# Patient Record
Sex: FEMALE | Race: WHITE | NOT HISPANIC OR LATINO
[De-identification: names, ages, dates, MRNs, and addresses within clinical notes are randomized per-mention and may not be internally consistent; named-entity substitution may affect disease eponyms.]

---

## 2021-05-17 ENCOUNTER — APPOINTMENT (OUTPATIENT)
Dept: BARIATRICS | Facility: CLINIC | Age: 46
End: 2021-05-17

## 2021-05-17 ENCOUNTER — APPOINTMENT (OUTPATIENT)
Dept: BARIATRICS | Facility: CLINIC | Age: 46
End: 2021-05-17
Payer: COMMERCIAL

## 2021-05-17 VITALS — HEIGHT: 60 IN | WEIGHT: 210 LBS | BODY MASS INDEX: 41.23 KG/M2

## 2021-05-17 DIAGNOSIS — Z86.59 PERSONAL HISTORY OF OTHER MENTAL AND BEHAVIORAL DISORDERS: ICD-10-CM

## 2021-05-17 DIAGNOSIS — Z86.73 PERSONAL HISTORY OF TRANSIENT ISCHEMIC ATTACK (TIA), AND CEREBRAL INFARCTION W/OUT RESIDUAL DEFICITS: ICD-10-CM

## 2021-05-17 DIAGNOSIS — Z78.9 OTHER SPECIFIED HEALTH STATUS: ICD-10-CM

## 2021-05-17 PROCEDURE — 99205 OFFICE O/P NEW HI 60 MIN: CPT | Mod: 95

## 2021-05-21 NOTE — ASSESSMENT
[FreeTextEntry1] : Diagnosis: OBesity Class 3 \par Lifestyle modification education provided. \par Dietary: Educated on healthy plate model. Encouraged to increase lean protein intake and fruit/vegetable intake daily. Encouraged to replace simple carbs with more complex carbs. Encouraged to track intake \par Exercise: set a goal to start walking while her son is in class/PT (20-30 minutes 2-3 times per week). Start using Pelaton when it arrives\par Anxiety- recommend deep breathing/ meditation with her son. Recommend reaching out to the therapist she has a working relationship with \par Medication: meets criteria to initiate weight loss medication & may benefit from Wellbutrin. pt does not want to start medication at this time. Will continue with lifestyle modification. \par Scheduled a follow up session in 2 weeks- pt feesl that she would benefit from frequent check ins. Schedule appt for June 1st at 7:00 am \par \par

## 2021-05-21 NOTE — HISTORY OF PRESENT ILLNESS
[Improved Health] : Improved health [Quality of Life] : Quality of life [Childhood] : childhood [Commerial Program: _____] : commercial program: [unfilled] [Poor eating habits] : poor eating habits [Time management] : time management [Cravings] : cravings [Motivation] : motivation [Unsure what to eat] : unsure what to eat [Depression/anxiety] : depression/anxiety [Other: ____] : [unfilled] [Other: (explain) _____] : [unfilled] [______] : [unfilled] [I sometimes wake up gasping for air] : I sometimes wake up gasping for air [Breakfast] : breakfast [Lunch] : lunch [Dinner] : dinner [Don't snack] : don't snack [1] : Cups of coffee per day: 1 [Nothing, just black coffee] : nothing, just black coffee [Self] : self [Emotional Eating] : emotional eating [2+ miles] : Walking distance capability: 2+ miles [Walking] : walking [Other: ___] : [unfilled] [0] : 0 [None] : none [GERD] : GERD [Home] : at home, [unfilled] , at the time of the visit. [Other Location: e.g. Home (Enter Location, City,State)___] : at [unfilled] [Verbal consent obtained from patient] : the patient, [unfilled] [FreeTextEntry2] : 150 [FreeTextEntry3] : 210 [] : No [FreeTextEntry1] : 46 y/o pt referred to medical weight loss by PCP- Dr. Cancino\par Pt has struggles with weight since she was a child\par Gained 50 lbs over the last 4 years, 20 lbs since 2020\par Has tried Whole 30, WW, Noom- succesful on Whole Thirty, gained weight on WW and doesn't like there strategy\par Feels that she struggles with cravings, but doesn't snack often\par Food Recall: B- Snehal Bar and black coffee, L- BEC on gluten free role, D- Chicken tenders and fries\par Denies formal exercise routine at present- doesn’t feel she has time to exercise- works full time as a [employer]  and takes care of her son who has ADHD. Ordered a pelaton that should be delivered in June \par Sleep - inadequate, wakes up often \par Medical Hx: GERD, TIA at age of 25, anxiety, panic attacks, celiacs disease\par

## 2021-06-01 ENCOUNTER — APPOINTMENT (OUTPATIENT)
Dept: BARIATRICS | Facility: CLINIC | Age: 46
End: 2021-06-01
Payer: COMMERCIAL

## 2021-06-01 VITALS — WEIGHT: 215 LBS | BODY MASS INDEX: 42.21 KG/M2 | HEIGHT: 60 IN

## 2021-06-01 PROCEDURE — 99213 OFFICE O/P EST LOW 20 MIN: CPT | Mod: 95

## 2021-06-01 NOTE — HISTORY OF PRESENT ILLNESS
[Home] : at home, [unfilled] , at the time of the visit. [Other Location: e.g. Home (Enter Location, City,State)___] : at [unfilled] [Verbal consent obtained from patient] : the patient, [unfilled] [FreeTextEntry1] : 46 y/o pt referred to medical weight loss by PCP- Dr. Cancino\par Pt has struggles with weight since she was a child\par Gained 50 lbs over the last 4 years, 20 lbs since 2020\par Has tried Whole 30, WW, Noom- succesful on Whole Thirty, gained weight on WW and doesn't like there strategy\par Feels that she struggles with cravings, but doesn't snack often\par Food Recall: B- Snehal Bar and black coffee, L- BEC on gluten free role, D- Chicken tenders and fries\par Denies formal exercise routine at present- doesn’t feel she has time to exercise- works full time as a [employer]  and takes care of her son who has ADHD. Ordered a pelaton that should be delivered in June \par Sleep - inadequate, wakes up often \par Water intake- inadequate\par Medical Hx: GERD, TIA at age of 25, anxiety, panic attacks, celiacs disease\par \par 6/1/21: Reports feeling well, was down 2 lbs last week but gained 5 lbs since she went on a family vacation. Has been focusing on tracking dietary intake and eating a healthier diet. Able to avoid eating unhealthy snacks at night, able to tell herself she doesn't need it. Walking more throughout the week- felt winded and frustrated, but happy that she was able to do a 5 mile walk on the beach this weekend. Increased water/seltzer intake to 24-48 ounces daily.

## 2021-06-01 NOTE — ASSESSMENT
[FreeTextEntry1] : Dietary modification- continue to track dietary intake to make healthy choices, increase daily water intake to a goal of 8 cups daily. \par Exercise: continue to focus on exercising at least 3 times per week. [employer] bike arrives this week, has strength training equipment to use in time. encouraged to start tracking daily steps with podometer.\par Anxiety- recommend continuing exercise and healthy diet to improve overall mental health\par Requests follow up appointment in 2 weeks- scheduled next session on 6/15 at 7 am

## 2021-06-15 ENCOUNTER — APPOINTMENT (OUTPATIENT)
Dept: BARIATRICS | Facility: CLINIC | Age: 46
End: 2021-06-15
Payer: COMMERCIAL

## 2021-06-15 VITALS — HEIGHT: 60 IN | BODY MASS INDEX: 41.52 KG/M2 | WEIGHT: 211.5 LBS

## 2021-06-15 PROCEDURE — 99214 OFFICE O/P EST MOD 30 MIN: CPT | Mod: 95

## 2021-06-15 NOTE — ASSESSMENT
[FreeTextEntry1] : Diagnosis Obesity Class 3:\par Celebrated her success of losing over 2 lbs per week- discussed that this is normal weight lose with lifestyle modification alone. \par Lifestyle modification- continue to eat healthier diet, continue walking daily, incorporate formal exercise on pelaton when able to tolerate\par Lab work- will check additional lab work- fasting insulin, thyroid studies, dex stress, hormone studies\par Vertigo- recommend seeing ENT if this continues\par Recommend increasing water intake to 8 cups daily \par Recommend taking Vit D supplements for low Vitamin D level\par RTO in 2-3 weeks for frequent follow up- scheduled appointment for 7/6 at 7am\par

## 2021-06-15 NOTE — HISTORY OF PRESENT ILLNESS
[Home] : at home, [unfilled] , at the time of the visit. [Other Location: e.g. Home (Enter Location, City,State)___] : at [unfilled] [Verbal consent obtained from patient] : the patient, [unfilled] [FreeTextEntry1] : 46 y/o pt referred to medical weight loss by PCP- Dr. 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 Ordered a pelaton that should be delivered in June \par Sleep - inadequate, wakes up often \par Water intake- inadequate\par Medical Hx: GERD, TIA at age of 25, anxiety, panic attacks, celiacs disease\par \par 6/1/21: Reports feeling well, was down 2 lbs last week but gained 5 lbs since she went on a family vacation. Has been focusing on tracking dietary intake and eating a healthier diet. Able to avoid eating unhealthy snacks at night, able to tell herself she doesn't need it. Walking more throughout the week- felt winded and frustrated, but happy that she was able to do a 5 mile walk on the beach this weekend. Increased water/seltzer intake to 24-48 ounces daily.\par \par 6/15/21: Lost 4.5 lbs, feels frustrated that she didn't lose more weight since she is eating a healthier diet. Food Recall: B- Pashto Yogurt with fruit, L- grilled chicken sausage with quinoa vegetable salad, D- chicken stuffed peppers with brown rice. Denies snacking. Wasn't able to exercise last week d/t pinched nerve in the neck and vertigo. Continues to focus on walking more, walked 6 miles one day. Had blood work done at her physical this year but wants to see if there is any specific reason that she cannot lose weight faster. Drinks about 44 ounces of water daily, yesterday didn't drink any water.

## 2021-07-12 ENCOUNTER — APPOINTMENT (OUTPATIENT)
Dept: BARIATRICS | Facility: CLINIC | Age: 46
End: 2021-07-12
Payer: COMMERCIAL

## 2021-07-12 VITALS — WEIGHT: 208 LBS | HEIGHT: 60 IN | BODY MASS INDEX: 40.84 KG/M2

## 2021-07-12 PROCEDURE — 99214 OFFICE O/P EST MOD 30 MIN: CPT | Mod: 95

## 2021-07-12 NOTE — HISTORY OF PRESENT ILLNESS
[FreeTextEntry1] : 44 y/o pt referred to medical weight loss by PCP- Dr. 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 Ordered a pelaton that should be delivered in June \par Sleep - inadequate, wakes up often \par Water intake- inadequate\par Medical Hx: GERD, TIA at age of 25, anxiety, panic attacks, celiacs disease\par \par 6/1/21: Reports feeling well, was down 2 lbs last week but gained 5 lbs since she went on a family vacation. Has been focusing on tracking dietary intake and eating a healthier diet. Able to avoid eating unhealthy snacks at night, able to tell herself she doesn't need it. Walking more throughout the week- felt winded and frustrated, but happy that she was able to do a 5 mile walk on the beach this weekend. Increased water/seltzer intake to 24-48 ounces daily.\par \par 6/15/21: Lost 4.5 lbs, feels frustrated that she didn't lose more weight since she is eating a healthier diet. Food Recall: B- Azerbaijani Yogurt with fruit, L- grilled chicken sausage with quinoa vegetable salad, D- chicken stuffed peppers with brown rice. Denies snacking. Wasn't able to exercise last week d/t pinched nerve in the neck and vertigo. Continues to focus on walking more, walked 6 miles one day. Had blood work done at her physical this year but wants to see if there is any specific reason that she cannot lose weight faster. Drinks about 44 ounces of water daily, yesterday didn't drink any water. \par \par 7/12/21: Lost 3 lbs, continues to focus on eating healthier diet, drinks 48-64 ounces of water & seltzer daily. Has been wearing a [employer] watch and feels more aware of her daily activity level. Does about 5,000 steps daily, just started using the [employer] bike yesterday. Feels that stress level is high, not currently seeing therapist. Tried meditation, which was helpful. Going to [place] for vacation with friends next week- planned out meals and going with a friend who is a .

## 2021-07-12 NOTE — ASSESSMENT
[FreeTextEntry1] : Lifestyle modification- discussed the importance of getting in three meals per day, focus on protein and vegetables first then add complex carbs. Continue to focus on increased water intake. \par RD Referral for more specific meal planning/dietary modification\par Recommend calling therapist to re-initiate therapy sessions for mental health. Recommend meditation & exercise to decrease stress. \par Exercise goal- 5,000 steps daily and use pelaton 4-5 times per week for 10-15 mins\par Labs- to be done or with fax over most recent results \par RTO in 2 weeks- July 26 at 7 am\par

## 2021-08-02 ENCOUNTER — APPOINTMENT (OUTPATIENT)
Dept: BARIATRICS | Facility: CLINIC | Age: 46
End: 2021-08-02

## 2021-08-16 ENCOUNTER — APPOINTMENT (OUTPATIENT)
Dept: INTERNAL MEDICINE | Facility: CLINIC | Age: 46
End: 2021-08-16
Payer: COMMERCIAL

## 2021-08-16 ENCOUNTER — APPOINTMENT (OUTPATIENT)
Dept: BARIATRICS | Facility: CLINIC | Age: 46
End: 2021-08-16
Payer: COMMERCIAL

## 2021-08-16 VITALS — WEIGHT: 206 LBS | HEIGHT: 60 IN | BODY MASS INDEX: 40.44 KG/M2

## 2021-08-16 PROCEDURE — 99214 OFFICE O/P EST MOD 30 MIN: CPT | Mod: 95

## 2021-08-16 PROCEDURE — 97802 MEDICAL NUTRITION INDIV IN: CPT | Mod: 95

## 2021-08-16 NOTE — ASSESSMENT
[FreeTextEntry1] : Diagnosis Obesity Class 3:\par Lifestyle modification- continue to eat healthier diet, continue walking daily, incorporate formal exercise after work 3 days per week with her son- recommend going directly to a park or track after work with her son. \par Recommend meal prep on the weekend to help with making good food choices throughout the week. \par Continue to focus on 8 cups of water daily\par RD- appointment with Kenna Asencio this week \par Lab work to be done ASAP\par Mental Health/Anxiety- recommend reaching out to therapist in the future\par RTO in 1 month- scheduled a follow up appointment on 9/13 at 7 am \par

## 2021-08-16 NOTE — HISTORY OF PRESENT ILLNESS
[FreeTextEntry1] : 46 y/o pt referred to medical weight loss by PCP- Dr. 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 Ordered a pelaton that should be delivered in June \par Sleep - inadequate, wakes up often \par Water intake- inadequate\par Medical Hx: GERD, TIA at age of 25, anxiety, panic attacks, celiacs disease\par \par 6/1/21: Reports feeling well, was down 2 lbs last week but gained 5 lbs since she went on a family vacation. Has been focusing on tracking dietary intake and eating a healthier diet. Able to avoid eating unhealthy snacks at night, able to tell herself she doesn't need it. Walking more throughout the week- felt winded and frustrated, but happy that she was able to do a 5 mile walk on the beach this weekend. Increased water/seltzer intake to 24-48 ounces daily.\par \par 6/15/21: Lost 4.5 lbs, feels frustrated that she didn't lose more weight since she is eating a healthier diet. Food Recall: B- Bahamian Yogurt with fruit, L- grilled chicken sausage with quinoa vegetable salad, D- chicken stuffed peppers with brown rice. Denies snacking. Wasn't able to exercise last week d/t pinched nerve in the neck and vertigo. Continues to focus on walking more, walked 6 miles one day. Had blood work done at her physical this year but wants to see if there is any specific reason that she cannot lose weight faster. Drinks about 44 ounces of water daily, yesterday didn't drink any water. \par \par 7/12/21: Lost 3 lbs, continues to focus on eating healthier diet, drinks 48-64 ounces of water & seltzer daily. Has been wearing a [employer] watch and feels more aware of her daily activity level. Does about 5,000 steps daily, just started using the [employer] bike yesterday. Feels that stress level is high, not currently seeing therapist. Tried meditation, which was helpful. Going to [place] for vacation with friends next week- planned out meals and going with a friend who is a . \par \par 8/16/21: Lost 2 lbs, continues to focus on healthier dietary choices and drinking more water daily. Realized that her activity level is better on vacation where she walks more throughout the day. Does 5,000 steps by the morning on vacation and does 3,000 steps daily when she is home. Sedentary at work and feels tired after work. Has been eating vegetables from her garden, but would like to focus on meal planning on the weekends. Doesn't eat after 6/7pm to prevent reflux. Plans on going to [place] in September with her son.

## 2021-09-13 ENCOUNTER — APPOINTMENT (OUTPATIENT)
Dept: BARIATRICS | Facility: CLINIC | Age: 46
End: 2021-09-13
Payer: COMMERCIAL

## 2021-09-13 VITALS — HEIGHT: 60 IN | WEIGHT: 207 LBS | BODY MASS INDEX: 40.64 KG/M2

## 2021-09-13 DIAGNOSIS — Z00.00 ENCOUNTER FOR GENERAL ADULT MEDICAL EXAMINATION W/OUT ABNORMAL FINDINGS: ICD-10-CM

## 2021-09-13 PROCEDURE — 99214 OFFICE O/P EST MOD 30 MIN: CPT | Mod: 95

## 2021-09-13 RX ORDER — FAMOTIDINE 40 MG/1
TABLET, FILM COATED ORAL
Refills: 0 | Status: ACTIVE | COMMUNITY

## 2021-09-13 RX ORDER — DEXAMETHASONE 1 MG/1
1 TABLET ORAL
Qty: 1 | Refills: 0 | Status: DISCONTINUED | COMMUNITY
Start: 2021-06-15 | End: 2021-09-13

## 2021-09-13 RX ORDER — OMEPRAZOLE 20 MG/1
20 CAPSULE, DELAYED RELEASE ORAL
Refills: 0 | Status: ACTIVE | COMMUNITY

## 2021-09-13 NOTE — HISTORY OF PRESENT ILLNESS
[Home] : at home, [unfilled] , at the time of the visit. [Other Location: e.g. Home (Enter Location, City,State)___] : at [unfilled] [Verbal consent obtained from patient] : the patient, [unfilled] [FreeTextEntry1] : 44 y/o pt referred to medical weight loss by PCP- Dr. 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 Ordered a pelaton that should be delivered in June \par Sleep - inadequate, wakes up often \par Water intake- inadequate\par Medical Hx: GERD, TIA at age of 25, anxiety, panic attacks, celiacs disease\par \par 6/1/21: Reports feeling well, was down 2 lbs last week but gained 5 lbs since she went on a family vacation. Has been focusing on tracking dietary intake and eating a healthier diet. Able to avoid eating unhealthy snacks at night, able to tell herself she doesn't need it. Walking more throughout the week- felt winded and frustrated, but happy that she was able to do a 5 mile walk on the beach this weekend. Increased water/seltzer intake to 24-48 ounces daily.\par \par 6/15/21: Lost 4.5 lbs, feels frustrated that she didn't lose more weight since she is eating a healthier diet. Food Recall: B- Croatian Yogurt with fruit, L- grilled chicken sausage with quinoa vegetable salad, D- chicken stuffed peppers with brown rice. Denies snacking. Wasn't able to exercise last week d/t pinched nerve in the neck and vertigo. Continues to focus on walking more, walked 6 miles one day. Had blood work done at her physical this year but wants to see if there is any specific reason that she cannot lose weight faster. Drinks about 44 ounces of water daily, yesterday didn't drink any water. \par \par 7/12/21: Lost 3 lbs, continues to focus on eating healthier diet, drinks 48-64 ounces of water & seltzer daily. Has been wearing a [employer] watch and feels more aware of her daily activity level. Does about 5,000 steps daily, just started using the Pelaton bike yesterday. Feels that stress level is high, not currently seeing therapist. Tried meditation, which was helpful. Going to [place] for vacation with friends next week- planned out meals and going with a friend who is a . \par \par 8/16/21: Lost 2 lbs, continues to focus on healthier dietary choices and drinking more water daily. Realized that her activity level is better on vacation where she walks more throughout the day. Does 5,000 steps by the morning on vacation and does 3,000 steps daily when she is home. Sedentary at work and feels tired after work. Has been eating vegetables from her garden, but would like to focus on meal planning on the weekends. Doesn't eat after 6/7pm to prevent reflux. Plans on going to [place] in September with her son. \par \par 9/13/21: Gained 1 lb, just got back from vacation in [place]. Walked 7- 10 miles daily, focused on drinking a lot of water, ate granola bars for breakfast and salads throughout the day, denies eating dessert. Feels frustrated that she didn't lose weight. Struggling with sleep while on vacation. Feels excited to get back into a routine with work & starting to focus on mindful eating and meal prepping for the week. Plans on eating vegetables from her garden with lean protein for dinner. Plans on making overnight oats with peanut butter and energy balls. Wants to start walking with son daily and using the Pelaton. Reports that reflux is worsening and plans to get an endoscopy in October.

## 2021-09-13 NOTE — ASSESSMENT
[FreeTextEntry1] : Lifestyle modification- continue to eat healthier diet focusing on high intake of vegetables and lean protein. Start meal prepping and tracking dietary intake. Recommend 64 ounces of water daily. Next RD appt on 9/27/21\par Exercise goal- pelaton 2-3 times per week, plus daily walks with son\par Sent video about mindful eating \par Labs recheck Vit D next apt \par RTO in 1 month- Oct 12 at 7am \par

## 2021-09-27 ENCOUNTER — APPOINTMENT (OUTPATIENT)
Dept: INTERNAL MEDICINE | Facility: CLINIC | Age: 46
End: 2021-09-27

## 2021-10-04 ENCOUNTER — APPOINTMENT (OUTPATIENT)
Dept: INTERNAL MEDICINE | Facility: CLINIC | Age: 46
End: 2021-10-04
Payer: COMMERCIAL

## 2021-10-04 PROCEDURE — 97803 MED NUTRITION INDIV SUBSEQ: CPT | Mod: 95

## 2021-10-12 ENCOUNTER — APPOINTMENT (OUTPATIENT)
Dept: BARIATRICS/WEIGHT MGMT | Facility: CLINIC | Age: 46
End: 2021-10-12
Payer: COMMERCIAL

## 2021-10-12 VITALS — BODY MASS INDEX: 40.84 KG/M2 | WEIGHT: 208 LBS | HEIGHT: 60 IN

## 2021-10-12 PROCEDURE — 99214 OFFICE O/P EST MOD 30 MIN: CPT | Mod: 95

## 2021-10-12 NOTE — ASSESSMENT
[FreeTextEntry1] : Lifestyle modification- continue meal prepping and tracking dietary intake. Continue mindful eating practices. \par Exercise goal- pelaton 2-3 times per week\par Vit D Supplement- re-iterated my recommendation to take 1,000 IU of Vit D suppelementation daily, will re-check Vit D in 3 months. \par Discussed option to treat obesity with Contrave. Pt denies contraindications to contrave. Reviewed common side effects including: N/C/V/D, headache, dizziness, insomnia, and dry mouth. Reviewed possible adverse effects with patient. Discussed titration schedule for contrave: start with 1 pill daily & increase by 1 pill weekly until you reach maximum dose of 4 tabs daily. Instructed to avoid taking with high fat meals.Discussed possibility of SI with initiation of medication treatment, plans to re-initiate therapy sessions and has my email for any concerns. \par Mental Health- pt to call and schedule an apt with therapist today- will email me once completed. \par RTO in 2 weeks- scheduled a f/u apt on 10/26 at 7 am \par \par

## 2021-10-12 NOTE — PHYSICAL EXAM
[Obese, well nourished, in no acute distress] : obese, well nourished, in no acute distress [Normal] : affect appropriate [de-identified] : sad

## 2021-10-12 NOTE — HISTORY OF PRESENT ILLNESS
[Home] : at home, [unfilled] , at the time of the visit. [Other Location: e.g. Home (Enter Location, City,State)___] : at [unfilled] [Verbal consent obtained from patient] : the patient, [unfilled] [FreeTextEntry1] : 46 y/o pt referred to medical weight loss by PCP- Dr. 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 Ordered a pelaton that should be delivered in June \par Sleep - inadequate, wakes up often \par Water intake- inadequate\par Medical Hx: GERD, TIA at age of 25, anxiety, panic attacks, celiacs disease\par \par 6/1/21: Reports feeling well, was down 2 lbs last week but gained 5 lbs since she went on a family vacation. Has been focusing on tracking dietary intake and eating a healthier diet. Able to avoid eating unhealthy snacks at night, able to tell herself she doesn't need it. Walking more throughout the week- felt winded and frustrated, but happy that she was able to do a 5 mile walk on the beach this [place]. Increased water/seltzer intake to 24-48 ounces daily.\par \par 6/15/21: Lost 4.5 lbs, feels frustrated that she didn't lose more weight since she is eating a healthier diet. Food Recall: B- [place] Yogurt with fruit, L- grilled chicken sausage with quinoa vegetable salad, D- chicken stuffed peppers with brown rice. Denies snacking. Wasn't able to exercise last week d/t pinched nerve in the neck and vertigo. Continues to focus on walking more, walked 6 miles one day. Had blood work done at her physical this year but wants to see if there is any specific reason that she cannot lose weight faster. Drinks about 44 ounces of water daily, yesterday didn't drink any water. \par \par 7/12/21: Lost 3 lbs, continues to focus on eating healthier diet, drinks 48-64 ounces of water & seltzer daily. Has been wearing a Garmin watch and feels more aware of her daily activity level. Does about 5,000 steps daily, just started using the Pelaton bike yesterday. Feels that stress level is high, not currently seeing therapist. Tried meditation, which was helpful. Going to [place] for vacation with friends next week- planned out meals and going with a friend who is a . \par \par 8/16/21: Lost 2 lbs, continues to focus on healthier dietary choices and drinking more water daily. Realized that her activity level is better on vacation where she walks more throughout the day. Does 5,000 steps by the morning on vacation and does 3,000 steps daily when she is home. Sedentary at work and feels tired after work. Has been eating vegetables from her garden, but would like to focus on meal planning on the weekends. Doesn't eat after 6/7pm to prevent reflux. Plans on going to [place] in September with her son. \par \par 9/13/21: Gained 1 lb, just got back from vacation in [place]. Walked 7- 10 miles daily, focused on drinking a lot of water, ate granola bars for breakfast and salads throughout the day, denies eating dessert. Feels frustrated that she didn't lose weight. Struggling with sleep while on vacation. Feels excited to get back into a routine with work & starting to focus on mindful eating and meal prepping for the week. Plans on eating vegetables from her garden with lean protein for dinner. Plans on making overnight oats with peanut butter and energy balls. Wants to start walking with son daily and using the Pelaton. Reports that reflux is worsening and plans to get an endoscopy in October.\par \par 10/12/21: Gained 1 lb, feels frustrated and sad that she hasn't been motivated to meal prep or exercise. Had a recent endoscopy that showed multiple ulcers- now taking pepcid and prilosec. Plans on meal prepping for this week- plans on eating yogurt with fruit for breakfast, roasted vegetables over kale for lunch today. Water intake adequate. Started mindful eating and finds it helpful to pause after 10 minutes of eating. Denies re-initiating therapy, feels embarrassed about her last interaction with her therapist. Understands that therapy would be helpful at this time and plans on calling to schedule an appointment today. Denies taking Vit D supplements. Open to trying medication for the treatment of obesity since she is frustrated after 5 months with minimal results.

## 2021-11-08 ENCOUNTER — APPOINTMENT (OUTPATIENT)
Dept: BARIATRICS/WEIGHT MGMT | Facility: CLINIC | Age: 46
End: 2021-11-08
Payer: COMMERCIAL

## 2021-11-08 DIAGNOSIS — K90.0 CELIAC DISEASE: ICD-10-CM

## 2021-11-08 PROCEDURE — 99214 OFFICE O/P EST MOD 30 MIN: CPT | Mod: 95

## 2021-11-08 NOTE — ASSESSMENT
[FreeTextEntry1] : Dietary Modification- continue meal prepping and mindful eating\par Exercise goal- start using pelaton 2 x per week \par Vit D supplement- 1,000 IU daily, recheck in 3 months\par Medication- meets criteria to start medication. Reluctant with taking contrave, discussed the option for GLP1. Educated on black box warning, mechanism of action and side effects. Plans to look into the medication before our next session. \par Mental Health- encouraged to schedule an apt with therapist\par RTO in 3 weeks- scheduled an apt for 11/30 at 7am

## 2021-11-08 NOTE — HISTORY OF PRESENT ILLNESS
[Home] : at home, [unfilled] , at the time of the visit. [Other Location: e.g. Home (Enter Location, City,State)___] : at [unfilled] [Verbal consent obtained from patient] : the patient, [unfilled] [FreeTextEntry1] : 47 y/o pt referred to medical weight loss by PCP- Dr. 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 Ordered a pelaton that should be delivered in June \par Sleep - inadequate, wakes up often \par Water intake- inadequate\par Medical Hx: GERD, TIA at age of 25, anxiety, panic attacks, celiacs disease\par \par 6/1/21: Reports feeling well, was down 2 lbs last week but gained 5 lbs since she went on a family vacation. Has been focusing on tracking dietary intake and eating a healthier diet. Able to avoid eating unhealthy snacks at night, able to tell herself she doesn't need it. Walking more throughout the week- felt winded and frustrated, but happy that she was able to do a 5 mile walk on the beach this weekend. Increased water/seltzer intake to 24-48 ounces daily.\par \par 6/15/21: Lost 4.5 lbs, feels frustrated that she didn't lose more weight since she is eating a healthier diet. Food Recall: B- [place] Yogurt with fruit, L- grilled chicken sausage with quinoa vegetable salad, D- chicken stuffed peppers with brown rice. Denies snacking. Wasn't able to exercise last week d/t pinched nerve in the neck and vertigo. Continues to focus on walking more, walked 6 miles one day. Had blood work done at her physical this year but wants to see if there is any specific reason that she cannot lose weight faster. Drinks about 44 ounces of water daily, yesterday didn't drink any water. \par \par 7/12/21: Lost 3 lbs, continues to focus on eating healthier diet, drinks 48-64 ounces of water & seltzer daily. Has been wearing a Garmin watch and feels more aware of her daily activity level. Does about 5,000 steps daily, just started using the Pelaton bike yesterday. Feels that stress level is high, not currently seeing therapist. Tried meditation, which was helpful. Going to [place] for vacation with friends next week- planned out meals and going with a friend who is a . \par \par 8/16/21: Lost 2 lbs, continues to focus on healthier dietary choices and drinking more water daily. Realized that her activity level is better on vacation where she walks more throughout the day. Does 5,000 steps by the morning on vacation and does 3,000 steps daily when she is home. Sedentary at work and feels tired after work. Has been eating vegetables from her garden, but would like to focus on meal planning on the weekends. Doesn't eat after 6/7pm to prevent reflux. Plans on going to [place] in September with her son. \par \par 9/13/21: Gained 1 lb, just got back from vacation in [place]. Walked 7- 10 miles daily, focused on drinking a lot of water, ate granola bars for breakfast and salads throughout the day, denies eating dessert. Feels frustrated that she didn't lose weight. Struggling with sleep while on vacation. Feels excited to get back into a routine with work & starting to focus on mindful eating and meal prepping for the week. Plans on eating vegetables from her garden with lean protein for dinner. Plans on making overnight oats with peanut butter and energy balls. Wants to start walking with son daily and using the Pelaton. Reports that reflux is worsening and plans to get an endoscopy in October.\par \par 10/12/21: Gained 1 lb, feels frustrated and sad that she hasn't been motivated to meal prep or exercise. Had a recent endoscopy that showed multiple ulcers- now taking pepcid and prilosec. Plans on meal prepping for this week- plans on eating yogurt with fruit for breakfast, roasted vegetables over kale for lunch today. Water intake adequate. Started mindful eating and finds it helpful to pause after 10 minutes of eating. Denies re-initiating therapy, feels embarrassed about her last interaction with her therapist. Understands that therapy would be helpful at this time and plans on calling to schedule an appointment today. Denies taking Vit D supplements. Open to trying medication for the treatment of obesity since she is frustrated after 5 months with minimal results. \par \par 11/8/21: Unable to weight herself. Feels better overall today. Denies starting contrave, feels nervous about the side effects. Has been meal prepping weekly. Made baked chicken, kale and feta for lunches. Eats yogurt with fruit for breakfast. Eats an early dinner and denies eating after 7 pm to avoid reflux. Doing hikes for exercise, was able to do a 4 mile hike yesterday. Found a place to start therapy in [place], needs to book an apt. Water intake adequate.

## 2022-01-04 ENCOUNTER — APPOINTMENT (OUTPATIENT)
Dept: BARIATRICS/WEIGHT MGMT | Facility: CLINIC | Age: 47
End: 2022-01-04
Payer: COMMERCIAL

## 2022-01-04 PROCEDURE — 99214 OFFICE O/P EST MOD 30 MIN: CPT | Mod: 95

## 2022-01-04 NOTE — HISTORY OF PRESENT ILLNESS
[Home] : at home, [unfilled] , at the time of the visit. [Other Location: e.g. Home (Enter Location, City,State)___] : at [unfilled] [Verbal consent obtained from patient] : the patient, [unfilled] [FreeTextEntry1] : 45 y/o pt referred to medical weight loss by PCP- Dr. 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 Ordered a pelaton that should be delivered in June \par Sleep - inadequate, wakes up often \par Water intake- inadequate\par Medical Hx: GERD, TIA at age of 25, anxiety, panic attacks, celiacs disease\par \par 6/1/21: Reports feeling well, was down 2 lbs last week but gained 5 lbs since she went on a family vacation. Has been focusing on tracking dietary intake and eating a healthier diet. Able to avoid eating unhealthy snacks at night, able to tell herself she doesn't need it. Walking more throughout the week- felt winded and frustrated, but happy that she was able to do a 5 mile walk on the beach this weekend. Increased water/seltzer intake to 24-48 ounces daily.\par \par 6/15/21: Lost 4.5 lbs, feels frustrated that she didn't lose more weight since she is eating a healthier diet. Food Recall: B- [place] Yogurt with fruit, L- grilled chicken sausage with quinoa vegetable salad, D- chicken stuffed peppers with brown rice. Denies snacking. Wasn't able to exercise last week d/t pinched nerve in the neck and vertigo. Continues to focus on walking more, walked 6 miles one day. Had blood work done at her physical this year but wants to see if there is any specific reason that she cannot lose weight faster. Drinks about 44 ounces of water daily, yesterday didn't drink any water. \par \par 7/12/21: Lost 3 lbs, continues to focus on eating healthier diet, drinks 48-64 ounces of water & seltzer daily. Has been wearing a Garmin watch and feels more aware of her daily activity level. Does about 5,000 steps daily, just started using the Pelaton bike yesterday. Feels that stress level is high, not currently seeing therapist. Tried meditation, which was helpful. Going to [place] for vacation with friends next week- planned out meals and going with a friend who is a . \par \par 8/16/21: Lost 2 lbs, continues to focus on healthier dietary choices and drinking more water daily. Realized that her activity level is better on vacation where she walks more throughout the day. Does 5,000 steps by the morning on vacation and does 3,000 steps daily when she is home. Sedentary at work and feels tired after work. Has been eating vegetables from her garden, but would like to focus on meal planning on the weekends. Doesn't eat after 6/7pm to prevent reflux. Plans on going to [place] in September with her son. \par \par 9/13/21: Gained 1 lb, just got back from vacation in [place]. Walked 7- 10 miles daily, focused on drinking a lot of water, ate granola bars for breakfast and salads throughout the day, denies eating dessert. Feels frustrated that she didn't lose weight. Struggling with sleep while on vacation. Feels excited to get back into a routine with work & starting to focus on mindful eating and meal prepping for the week. Plans on eating vegetables from her garden with lean protein for dinner. Plans on making overnight oats with peanut butter and energy balls. Wants to start walking with son daily and using the Pelaton. Reports that reflux is worsening and plans to get an endoscopy in October.\par \par 10/12/21: Gained 1 lb, feels frustrated and sad that she hasn't been motivated to meal prep or exercise. Had a recent endoscopy that showed multiple ulcers- now taking pepcid and prilosec. Plans on meal prepping for this week- plans on eating yogurt with fruit for breakfast, roasted vegetables over kale for lunch today. Water intake adequate. Started mindful eating and finds it helpful to pause after 10 minutes of eating. Denies re-initiating therapy, feels embarrassed about her last interaction with her therapist. Understands that therapy would be helpful at this time and plans on calling to schedule an appointment today. Denies taking Vit D supplements. Open to trying medication for the treatment of obesity since she is frustrated after 5 months with minimal results. \par \par 11/8/21: Unable to weight herself. Feels better overall today. Denies starting contrave, feels nervous about the side effects. Has been meal prepping weekly. Made baked chicken, kale and feta for lunches. Eats yogurt with fruit for breakfast. Eats an early dinner and denies eating after 7 pm to avoid reflux. Doing hikes for exercise, was able to do a 4 mile hike yesterday. Found a place to start therapy in [place], needs to book an apt. Water intake adequate. \par \par 1/4/22: Maintained weight, hasn't been on track with healthy diet & exercise. No motivation to live a healthy lifestyle  even though she knows it is the best thing for her. Took on an additional job, had COVID the last 2 weeks, reports a lingering cough. Worried that her special needs son's school will go remote. Ate cheese and crackers for dinner last night. Denies scheduling a therapy session, but is open to doing this today. Reports feeling very alone as a single mom and feels that she has limited support at present.

## 2022-01-04 NOTE — ASSESSMENT
[FreeTextEntry1] : Lifestyle modification-recommend meal prepping- discussed healthy options (hummus with vegetables, hard boiled eggs, soups, etc. May benefit from getting healthy frozen meals prepared by local chiefs- plans to order from "[employer]" online. \par Exercise goal- outdoor walks with son or start using Pelaton\par Vit D Supplement- recheck Vit D in Feb\par Discussed option to treat obesity with Contrave or GLPI to help increase motivation and to help with decreasing cravings and increased satiety. Declines at this time.\par Mental Health- pt to call and schedule an apt with [facility] today. Discussed that this is the most important next step in order for her to achieve her goals. \par RTO in 1 month- scheduled an apt for 2/1 at 7 am \par \par

## 2022-01-12 ENCOUNTER — APPOINTMENT (OUTPATIENT)
Dept: HEMATOLOGY ONCOLOGY | Facility: CLINIC | Age: 47
End: 2022-01-12
Payer: COMMERCIAL

## 2022-01-12 VITALS
HEART RATE: 76 BPM | OXYGEN SATURATION: 97 % | RESPIRATION RATE: 18 BRPM | DIASTOLIC BLOOD PRESSURE: 76 MMHG | SYSTOLIC BLOOD PRESSURE: 110 MMHG | HEIGHT: 60 IN | WEIGHT: 201 LBS | TEMPERATURE: 99.2 F | BODY MASS INDEX: 39.46 KG/M2

## 2022-01-12 DIAGNOSIS — F41.9 ANXIETY DISORDER, UNSPECIFIED: ICD-10-CM

## 2022-01-12 DIAGNOSIS — Z80.0 FAMILY HISTORY OF MALIGNANT NEOPLASM OF DIGESTIVE ORGANS: ICD-10-CM

## 2022-01-12 DIAGNOSIS — K21.9 GASTRO-ESOPHAGEAL REFLUX DISEASE W/OUT ESOPHAGITIS: ICD-10-CM

## 2022-01-12 DIAGNOSIS — Z80.49 FAMILY HISTORY OF MALIGNANT NEOPLASM OF OTHER GENITAL ORGANS: ICD-10-CM

## 2022-01-12 PROCEDURE — 99205 OFFICE O/P NEW HI 60 MIN: CPT

## 2022-01-12 NOTE — ASSESSMENT
[FreeTextEntry1] : This is a 46-year-old woman who had a TIA in her 20s.  She had an extensive work-up done at [facility] which we do not have.  Apparently she was told that she had some sort of clotting factor.  Later in 2016 she had another work-up done with her gynecologist and was treated with aspirin during her pregnancy.\par Patient has been without any thrombotic event since her initial event in the early 2000's.\par \par \par 1) TIA/hypercoag state \par -It is unlikely we will be able to obtain the records from [facility] that describe the initial event and work-up that was done at that time\par -family hx of CAD and high cholesterol \par -However patient did have hypercoagulable work-up done with Dr. Wilks  her gynecologist in 2016 when she was pregnant with her son.\par -Once I get the results of this work-up will determine what additional hypercoagulable work-up should be sent.\par -Patient also has an appointment with a cardiologist and I have advised her to discuss whether or not a bubble study should be done to look for PFO, if this was never done, and also to discuss her elevation in cholesterol.\par -Advised weight loss\par -may benefit from asa every other day ( had trouble with daily ) \par -educated on s/s of thrombosis \par -s/p recent doppler of left lowr ext , did not take xarelto  post covid as was rec \par -Patient will come back in for hypercoagulable work-up once we have the results of the prior work-up so that genetic testing is not duplicated.  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

## 2022-01-12 NOTE — REASON FOR VISIT
[Initial Consultation] : an initial consultation for [FreeTextEntry2] : Patient presents for evaluation for hypercoagulable state

## 2022-01-12 NOTE — HISTORY OF PRESENT ILLNESS
[de-identified] : 45 y/o woman who had covid 19 recently in dec , started  on xarelto 10 mg a day for prophylaxis for hx of TIA in past. \par \par Doppler of leg negative \par \par When 26 y/o while driving, had blurry vision and numbness arm weakness and couldn’t see pulled over and went to [facility] \par One week in hospital at [facility], did a lot of testing. 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 Stopped seeing him long time ago. \par No birth control pills or smoking\par Did MRI  tranesophageal echo , ? bubble study \par \par When pregnant , Dr lora , in 2016 did hypercoag work up.  Was back on asa 81 mg until 32 weeks. \par Delieved full term , no complications \par \par Will be seeing cardiology next week ( for fluttering ) \par Tried to see neuro for headches but unable to make appt \par Symptoms from TIA completely resolved \par \par ultraosund of left leg negative when had covid was negative \par \par  [de-identified] : grand father MI at 79 ( couamdin for heart ? ) \par first cousin has high lipoprotien \par \par dad colon cancer 62 yr old ,  dad's mom had uterine cancer , dad's sister uterine cancer \par mom 's mom had colon cancer \par mom had hysterctomy \par colonoscopy done at 42 ( none since then ) \par mammo july 2021 , gyn up todate \par \par celiac diseae \par \par [facility] clerk radha guo \par

## 2022-01-18 ENCOUNTER — APPOINTMENT (OUTPATIENT)
Dept: HEART AND VASCULAR | Facility: CLINIC | Age: 47
End: 2022-01-18
Payer: COMMERCIAL

## 2022-01-18 VITALS
BODY MASS INDEX: 40.64 KG/M2 | HEIGHT: 60 IN | OXYGEN SATURATION: 98 % | SYSTOLIC BLOOD PRESSURE: 110 MMHG | HEART RATE: 88 BPM | WEIGHT: 207 LBS | DIASTOLIC BLOOD PRESSURE: 80 MMHG | TEMPERATURE: 98.7 F

## 2022-01-18 DIAGNOSIS — R00.2 PALPITATIONS: ICD-10-CM

## 2022-01-18 PROCEDURE — 99204 OFFICE O/P NEW MOD 45 MIN: CPT

## 2022-01-18 RX ORDER — NALTREXONE HYDROCHLORIDE AND BUPROPION HYDROCHLORIDE 8; 90 MG/1; MG/1
8-90 TABLET, EXTENDED RELEASE ORAL
Qty: 70 | Refills: 0 | Status: DISCONTINUED | COMMUNITY
Start: 2021-10-12 | End: 2022-01-18

## 2022-01-18 NOTE — HISTORY OF PRESENT ILLNESS
[FreeTextEntry1] : 47 y/o F with PMHx of TIA, Celiac disease, GERD, s/p COVID 12/21 here for evaluation of periodic fluttering in chest that she has started noticing post COVID and also feeling more fatigued / decreased ETT\par \par EKG 1/11/2022: NSR, HR 80s

## 2022-01-18 NOTE — DISCUSSION/SUMMARY
[___ Month(s)] : in [unfilled] month(s) [FreeTextEntry1] : 45 y/o F with PMHx of TIA, Celiac disease, GERD, s/p COVID 12/21 here for evaluation of periodic fluttering in chest that she has started noticing post COVID and also feeling more fatigued / decreased ETT\par \par \par # Palpitations\par # Fatigue\par - Will order Event monitor to assess for arrhythmias\par - Echo post COVID eval due to persistent fatigue, r/o structural heart disease\par \par # Hx of TIA\par Reports she had TIA at young age, had CHRIS performed at the time as well as Carotid study, no significant findings were seen. She has not had any issues since\par No indication for further work up at this time

## 2022-01-18 NOTE — REVIEW OF SYSTEMS
[Palpitations] : palpitations [Negative] : Heme/Lymph [Feeling Fatigued] : feeling fatigued [SOB] : no shortness of breath [Dyspnea on exertion] : not dyspnea during exertion [Chest Discomfort] : no chest discomfort [Lower Ext Edema] : no extremity edema [Orthopnea] : no orthopnea [PND] : no PND [Syncope] : no syncope

## 2022-01-19 ENCOUNTER — LABORATORY RESULT (OUTPATIENT)
Age: 47
End: 2022-01-19

## 2022-01-19 ENCOUNTER — NON-APPOINTMENT (OUTPATIENT)
Age: 47
End: 2022-01-19

## 2022-01-19 ENCOUNTER — APPOINTMENT (OUTPATIENT)
Dept: HEMATOLOGY ONCOLOGY | Facility: CLINIC | Age: 47
End: 2022-01-19

## 2022-01-19 VITALS
TEMPERATURE: 98.1 F | SYSTOLIC BLOOD PRESSURE: 122 MMHG | HEIGHT: 60 IN | DIASTOLIC BLOOD PRESSURE: 89 MMHG | WEIGHT: 209.13 LBS | RESPIRATION RATE: 18 BRPM | BODY MASS INDEX: 41.06 KG/M2 | OXYGEN SATURATION: 97 % | HEART RATE: 82 BPM

## 2022-01-22 LAB
ABR COMMENT: NORMAL
ALBUMIN SERPL ELPH-MCNC: 4.1 G/DL
ALP BLD-CCNC: 63 U/L
ALT SERPL-CCNC: 23 U/L
ANA PAT FLD IF-IMP: ABNORMAL
ANA SER IF-ACNC: ABNORMAL
ANION GAP SERPL CALC-SCNC: 15 MMOL/L
AST SERPL-CCNC: 18 U/L
B2 GLYCOPROT1 IGA SERPL IA-ACNC: <5 SAU
B2 GLYCOPROT1 IGG SER-ACNC: <5 SGU
B2 GLYCOPROT1 IGM SER-ACNC: <5 SMU
BILIRUB SERPL-MCNC: 0.3 MG/DL
BUN SERPL-MCNC: 15 MG/DL
CALCIUM SERPL-MCNC: 9.4 MG/DL
CHLORIDE SERPL-SCNC: 101 MMOL/L
CO2 SERPL-SCNC: 24 MMOL/L
CREAT SERPL-MCNC: 0.84 MG/DL
FACT VIII ACT/NOR PPP: 131 %
FERRITIN SERPL-MCNC: 95 NG/ML
FOLATE SERPL-MCNC: 2.2 NG/ML
GLUCOSE SERPL-MCNC: 82 MG/DL
HCYS SERPL-MCNC: 37.2 UMOL/L
IRON SATN MFR SERPL: 28 %
IRON SERPL-MCNC: 77 UG/DL
LDH SERPL-CCNC: 203 U/L
PNH GRANULOCYTES: 0 %
PNH MONOCYTES: 0 %
PNH RBC - COMPLETE: 0 %
PNH RBC - PARTIAL: 0 %
POTASSIUM SERPL-SCNC: 4.7 MMOL/L
PROT SERPL-MCNC: 6.8 G/DL
SODIUM SERPL-SCNC: 140 MMOL/L
TIBC SERPL-MCNC: 270 UG/DL
TSH SERPL-ACNC: 2.56 UIU/ML
UIBC SERPL-MCNC: 193 UG/DL
VIT B12 SERPL-MCNC: 340 PG/ML

## 2022-01-24 ENCOUNTER — NON-APPOINTMENT (OUTPATIENT)
Age: 47
End: 2022-01-24

## 2022-01-30 LAB
CARDIOLIPIN AB SER IA-ACNC: NEGATIVE
PS IGA SER QL: 1
PS IGG SER QL: <9 UNITS
PS IGM SER QL: 11 UNITS

## 2022-02-01 ENCOUNTER — APPOINTMENT (OUTPATIENT)
Dept: BARIATRICS/WEIGHT MGMT | Facility: CLINIC | Age: 47
End: 2022-02-01
Payer: COMMERCIAL

## 2022-02-01 PROCEDURE — 99214 OFFICE O/P EST MOD 30 MIN: CPT | Mod: 95

## 2022-02-01 NOTE — HISTORY OF PRESENT ILLNESS
[FreeTextEntry1] : 47 y/o pt referred to medical weight loss by PCP- Dr. 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 Ordered a pelaton that should be delivered in June \par Sleep - inadequate, wakes up often \par Water intake- inadequate\par Medical Hx: GERD, TIA at age of 25, anxiety, panic attacks, celiacs disease\par \par 6/1/21: Reports feeling well, was down 2 lbs last week but gained 5 lbs since she went on a family vacation. Has been focusing on tracking dietary intake and eating a healthier diet. Able to avoid eating unhealthy snacks at night, able to tell herself she doesn't need it. Walking more throughout the week- felt winded and frustrated, but happy that she was able to do a 5 mile walk on the beach this weekend. Increased water/seltzer intake to 24-48 ounces daily.\par \par 6/15/21: Lost 4.5 lbs, feels frustrated that she didn't lose more weight since she is eating a healthier diet. Food Recall: B- Gabonese Yogurt with fruit, L- grilled chicken sausage with quinoa vegetable salad, D- chicken stuffed peppers with brown rice. Denies snacking. Wasn't able to exercise last week d/t pinched nerve in the neck and vertigo. Continues to focus on walking more, walked 6 miles one day. Had blood work done at her physical this year but wants to see if there is any specific reason that she cannot lose weight faster. Drinks about 44 ounces of water daily, yesterday didn't drink any water. \par \par 7/12/21: Lost 3 lbs, continues to focus on eating healthier diet, drinks 48-64 ounces of water & seltzer daily. Has been wearing a Garmin watch and feels more aware of her daily activity level. Does about 5,000 steps daily, just started using the Pelaton bike yesterday. Feels that stress level is high, not currently seeing therapist. Tried meditation, which was helpful. Going to [place] for vacation with friends next week- planned out meals and going with a friend who is a . \par \par 8/16/21: Lost 2 lbs, continues to focus on healthier dietary choices and drinking more water daily. Realized that her activity level is better on vacation where she walks more throughout the day. Does 5,000 steps by the morning on vacation and does 3,000 steps daily when she is home. Sedentary at work and feels tired after work. Has been eating vegetables from her garden, but would like to focus on meal planning on the weekends. Doesn't eat after 6/7pm to prevent reflux. Plans on going to [place] in September with her son. \par \par 9/13/21: Gained 1 lb, just got back from vacation in [place]. Walked 7- 10 miles daily, focused on drinking a lot of water, ate granola bars for breakfast and salads throughout the day, denies eating dessert. Feels frustrated that she didn't lose weight. Struggling with sleep while on vacation. Feels excited to get back into a routine with work & starting to focus on mindful eating and meal prepping for the week. Plans on eating vegetables from her garden with lean protein for dinner. Plans on making overnight oats with peanut butter and energy balls. Wants to start walking with son daily and using the Pelaton. Reports that reflux is worsening and plans to get an endoscopy in October.\par \par 10/12/21: Gained 1 lb, feels frustrated and sad that she hasn't been motivated to meal prep or exercise. Had a recent endoscopy that showed multiple ulcers- now taking pepcid and prilosec. Plans on meal prepping for this week- plans on eating yogurt with fruit for breakfast, roasted vegetables over kale for lunch today. Water intake adequate. Started mindful eating and finds it helpful to pause after 10 minutes of eating. Denies re-initiating therapy, feels embarrassed about her last interaction with her therapist. Understands that therapy would be helpful at this time and plans on calling to schedule an appointment today. Denies taking Vit D supplements. Open to trying medication for the treatment of obesity since she is frustrated after 5 months with minimal results. \par \par 11/8/21: Unable to weight herself. Feels better overall today. Denies starting contrave, feels nervous about the side effects. Has been meal prepping weekly. Made baked chicken, kale and feta for lunches. Eats yogurt with fruit for breakfast. Eats an early dinner and denies eating after 7 pm to avoid reflux. Doing hikes for exercise, was able to do a 4 mile hike yesterday. Found a place to start therapy in [place], needs to book an apt. Water intake adequate. \par \par 1/4/22: Maintained weight, hasn't been on track with healthy diet & exercise. No motivation to live a healthy lifestyle  even though she knows it is the best thing for her. Took on an additional job, had COVID the last 2 weeks, reports a lingering cough. Worried that her special needs son's school will go remote. Ate cheese and crackers for dinner last night. Denies scheduling a therapy session, but is open to doing this today. Reports feeling very alone as a single mom and feels that she has limited support at present. \par \par 2/1/22: Maintained weight, has been eating better and meal planning. Has been seeing multiple providers since she had COVID for a new heart palpitation and fatigue. Continues to struggle with doing exercise due to her busy schedule with 2 jobs. Show interest in starting to go to the gym but lacks motivation. Tried to start therapy but unable to find an available provider.

## 2022-02-01 NOTE — ASSESSMENT
[FreeTextEntry1] : Lifestyle modification- continue meal prepping and tracking dietary intake. Continue mindful eating practices. \par Exercise goal- pelaton 2-3 times per week or try to find a class for herself- shows interest in goign to the gym or doing a boxing class \par Vit D Supplement- recommend Vit D recheck now \par Mental Health- feels that this is the most important next step to increase motivation for exercise and healthy lifestyle. Referred to [facility] for Mental health support. Plans on calling today.\par RTO in 1-2 months- scheduled a f/u on 3/22 at 7am\par \par

## 2022-02-03 ENCOUNTER — APPOINTMENT (OUTPATIENT)
Dept: HEMATOLOGY ONCOLOGY | Facility: CLINIC | Age: 47
End: 2022-02-03
Payer: COMMERCIAL

## 2022-02-03 VITALS
HEIGHT: 60 IN | HEART RATE: 92 BPM | WEIGHT: 212 LBS | SYSTOLIC BLOOD PRESSURE: 113 MMHG | OXYGEN SATURATION: 97 % | BODY MASS INDEX: 41.62 KG/M2 | TEMPERATURE: 98.7 F | DIASTOLIC BLOOD PRESSURE: 81 MMHG | RESPIRATION RATE: 18 BRPM

## 2022-02-03 DIAGNOSIS — D68.59 OTHER PRIMARY THROMBOPHILIA: ICD-10-CM

## 2022-02-03 DIAGNOSIS — R53.83 OTHER FATIGUE: ICD-10-CM

## 2022-02-03 DIAGNOSIS — G45.9 TRANSIENT CEREBRAL ISCHEMIC ATTACK, UNSPECIFIED: ICD-10-CM

## 2022-02-03 DIAGNOSIS — E55.9 VITAMIN D DEFICIENCY, UNSPECIFIED: ICD-10-CM

## 2022-02-03 DIAGNOSIS — Z91.89 OTHER SPECIFIED PERSONAL RISK FACTORS, NOT ELSEWHERE CLASSIFIED: ICD-10-CM

## 2022-02-03 PROCEDURE — 99214 OFFICE O/P EST MOD 30 MIN: CPT

## 2022-02-03 NOTE — HISTORY OF PRESENT ILLNESS
[de-identified] : 45 y/o woman who had covid 19 recently in dec , started  on xarelto 10 mg a day for prophylaxis for hx of TIA in past. \par \par Doppler of leg negative \par \par When 26 y/o while driving, had blurry vision and numbness arm weakness and couldn’t see pulled over and went to [facility] \par One week in hospital at [facility], did a lot of testing. 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 Stopped seeing him long time ago. \par No birth control pills or smoking\par Did MRI  tranesophageal echo , ? bubble study \par \par When pregnant , Dr lora , in 2016 did hypercoag work up.  Was back on asa 81 mg until 32 weeks. \par Delieved full term , no complications \par \par Will be seeing cardiology next week ( for fluttering ) \par Tried to see neuro for headches but unable to make appt \par Symptoms from TIA completely resolved \par \par ultraosund of left leg negative when had covid was negative \par \par grand father MI at 79 ( couamdin for heart ? ) \par first cousin has high lipoprotien \par \par dad colon cancer 62 yr old ,  dad's mom had uterine cancer , dad's sister uterine cancer \par mom 's mom had colon cancer \par mom had hysterctomy \par colonoscopy done at 42 ( none since then ) \par mammo july 2021 , gyn up todate \par  [de-identified] : Patient continues to feel well she denies any new complaints\par No leg swelling shortness of breath or strokelike symptoms\par \par Work-up from gynecologist in 2016 reviewed negative for factor V Leiden prothrombin gene mutation lupus anticoagulant Antithrombin III protein C and S\par KIRSTIN polymorphism was sent but I do not have the results.\par MTHFR was positive for 2 abnormal copies,  C677T \par \par Additional work-up was performed at last visit positive for ANN, elevated homocysteine level\par Negative PNH, antiphospholipid antibody work-up also negative, factor VIII level mildly elevated\par \par B12 and folic acid low\par \par [facility] genetic testing negative

## 2022-02-03 NOTE — ASSESSMENT
[FreeTextEntry1] : This is a 46-year-old woman who had a TIA in her 20s.  She had an extensive work-up done at [facility] which we do not have.  Apparently she was told that she had some sort of clotting factor.  Later in 2016 she had another work-up done with her gynecologist and was treated with aspirin during her pregnancy.\par Patient has been without any thrombotic event since her initial event in the early 2000's.\par \par \par 1) TIA/hypercoag state \par -It is unlikely we will be able to obtain the records from [facility] that describe the initial event and work-up that was done at that time\par -family hx of CAD and high cholesterol \par -Hypercoagulable work-up reviewed as above negative for factor V Leiden prothrombin gene mutation lupus anticoagulant protein C, protein S, Antithrombin III\par -Positive for 2 copies of MTHFR C677T, elevation of homocystine level, mildly elevated factor VIII\par -Repeat homocysteine and factor VIII next month\par -Obtain KIRSTIN polymorphism report from 2016\par -As of now there is no indication for full dose anticoagulation but I think it is reasonable to start a baby aspirin every other day given her history\par -Patient was also advised for exercise and weight loss\par -follow up cardiology ? 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

## 2022-02-03 NOTE — REASON FOR VISIT
[Follow-Up Visit] : a follow-up visit for [FreeTextEntry2] : Patient presents for follow-up of hypercoagulable state

## 2022-03-29 ENCOUNTER — APPOINTMENT (OUTPATIENT)
Dept: HEMATOLOGY ONCOLOGY | Facility: CLINIC | Age: 47
End: 2022-03-29

## 2022-03-29 VITALS
BODY MASS INDEX: 41.43 KG/M2 | WEIGHT: 211 LBS | SYSTOLIC BLOOD PRESSURE: 123 MMHG | OXYGEN SATURATION: 98 % | HEIGHT: 60 IN | DIASTOLIC BLOOD PRESSURE: 85 MMHG | HEART RATE: 79 BPM | TEMPERATURE: 98.1 F | RESPIRATION RATE: 18 BRPM

## 2022-03-30 ENCOUNTER — NON-APPOINTMENT (OUTPATIENT)
Age: 47
End: 2022-03-30

## 2022-03-30 LAB
FACT VIII ACT/NOR PPP: 162 %
FOLATE SERPL-MCNC: 2.6 NG/ML
HCYS SERPL-MCNC: 17.2 UMOL/L
VIT B12 SERPL-MCNC: 399 PG/ML

## 2022-03-31 LAB — ANA SER IF-ACNC: NEGATIVE

## 2022-04-04 ENCOUNTER — APPOINTMENT (OUTPATIENT)
Dept: BARIATRICS/WEIGHT MGMT | Facility: CLINIC | Age: 47
End: 2022-04-04
Payer: COMMERCIAL

## 2022-04-04 VITALS — HEIGHT: 60 IN | BODY MASS INDEX: 41.23 KG/M2 | WEIGHT: 210 LBS

## 2022-04-04 PROCEDURE — 99214 OFFICE O/P EST MOD 30 MIN: CPT | Mod: 95

## 2022-04-04 RX ORDER — NALTREXONE HYDROCHLORIDE AND BUPROPION HYDROCHLORIDE 8; 90 MG/1; MG/1
8-90 TABLET, EXTENDED RELEASE ORAL
Qty: 120 | Refills: 2 | Status: ACTIVE | COMMUNITY
Start: 2022-04-04 | End: 1900-01-01

## 2022-04-04 NOTE — ASSESSMENT
[FreeTextEntry1] : Lifestyle modification- continue meal prepping and tracking dietary intake. Continue mindful eating practices. \par Exercise goal- rec scheduling exercise into her weekly plans. Would benefit from doing it in the morning. Goal to use the Pelaton 2 tmes per week and continue outdoor hikes on the weekend with her son. \par Discussed option to treat obesity with Contrave. Pt denies contraindications to contrave. Reviewed common side effects including: N/C/V/D, headache, dizziness, insomnia, and dry mouth. Reviewed possible adverse effects with patient. Discussed titration schedule for contrave: start with 1 pill daily & increase by 1 pill weekly until you reach maximum dose of 4 tabs daily. Instructed to avoid taking with high fat meals.Discussed possibility of SI with initiation of medication treatment, plans to re-initiate therapy sessions and has my email for any concerns. \par Mental Health- pt to call and schedule an apt with therapist today- plans on calling "[facility]" to see if they have availability\par RTO in 1 month- scheduled a f/u TH apt on 5/2 at 7am\par \par

## 2022-04-04 NOTE — HISTORY OF PRESENT ILLNESS
[FreeTextEntry1] : 45 y/o pt referred to medical weight loss by PCP- Dr. 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 Ordered a pelaton that should be delivered in June \par Sleep - inadequate, wakes up often \par Water intake- inadequate\par Medical Hx: GERD, TIA at age of 25, anxiety, panic attacks, celiacs disease\par \par 6/1/21: Reports feeling well, was down 2 lbs last week but gained 5 lbs since she went on a family vacation. Has been focusing on tracking dietary intake and eating a healthier diet. Able to avoid eating unhealthy snacks at night, able to tell herself she doesn't need it. Walking more throughout the week- felt winded and frustrated, but happy that she was able to do a 5 mile walk on the beach this weekend. Increased water/seltzer intake to 24-48 ounces daily.\par \par 6/15/21: Lost 4.5 lbs, feels frustrated that she didn't lose more weight since she is eating a healthier diet. Food Recall: B- Latvian Yogurt with fruit, L- grilled chicken sausage with quinoa vegetable salad, D- chicken stuffed peppers with brown rice. Denies snacking. Wasn't able to exercise last week d/t pinched nerve in the neck and vertigo. Continues to focus on walking more, walked 6 miles one day. Had blood work done at her physical this year but wants to see if there is any specific reason that she cannot lose weight faster. Drinks about 44 ounces of water daily, yesterday didn't drink any water. \par \par 7/12/21: Lost 3 lbs, continues to focus on eating healthier diet, drinks 48-64 ounces of water & seltzer daily. Has been wearing a Garmin watch and feels more aware of her daily activity level. Does about 5,000 steps daily, just started using the Pelaton bike yesterday. Feels that stress level is high, not currently seeing therapist. Tried meditation, which was helpful. Going to [place] for vacation with friends next week- planned out meals and going with a friend who is a . \par \par 8/16/21: Lost 2 lbs, continues to focus on healthier dietary choices and drinking more water daily. Realized that her activity level is better on vacation where she walks more throughout the day. Does 5,000 steps by the morning on vacation and does 3,000 steps daily when she is home. Sedentary at work and feels tired after work. Has been eating vegetables from her garden, but would like to focus on meal planning on the weekends. Doesn't eat after 6/7pm to prevent reflux. Plans on going to [place] in September with her son. \par \par 9/13/21: Gained 1 lb, just got back from vacation in [place]. Walked 7- 10 miles daily, focused on drinking a lot of water, ate granola bars for breakfast and salads throughout the day, denies eating dessert. Feels frustrated that she didn't lose weight. Struggling with sleep while on vacation. Feels excited to get back into a routine with work & starting to focus on mindful eating and meal prepping for the week. Plans on eating vegetables from her garden with lean protein for dinner. Plans on making overnight oats with peanut butter and energy balls. Wants to start walking with son daily and using the Pelaton. Reports that reflux is worsening and plans to get an endoscopy in October.\par \par 10/12/21: Gained 1 lb, feels frustrated and sad that she hasn't been motivated to meal prep or exercise. Had a recent endoscopy that showed multiple ulcers- now taking pepcid and prilosec. Plans on meal prepping for this week- plans on eating yogurt with fruit for breakfast, roasted vegetables over kale for lunch today. Water intake adequate. Started mindful eating and finds it helpful to pause after 10 minutes of eating. Denies re-initiating therapy, feels embarrassed about her last interaction with her therapist. Understands that therapy would be helpful at this time and plans on calling to schedule an appointment today. Denies taking Vit D supplements. Open to trying medication for the treatment of obesity since she is frustrated after 5 months with minimal results. \par \par 11/8/21: Unable to weight herself. Feels better overall today. Denies starting contrave, feels nervous about the side effects. Has been meal prepping weekly. Made baked chicken, kale and feta for lunches. Eats yogurt with fruit for breakfast. Eats an early dinner and denies eating after 7 pm to avoid reflux. Doing hikes for exercise, was able to do a 4 mile hike yesterday. Found a place to start therapy in [place], needs to book an apt. Water intake adequate. \par \par 1/4/22: Maintained weight, hasn't been on track with healthy diet & exercise. No motivation to live a healthy lifestyle  even though she knows it is the best thing for her. Took on an additional job, had COVID the last 2 weeks, reports a lingering cough. Worried that her special needs son's school will go remote. Ate cheese and crackers for dinner last night. Denies scheduling a therapy session, but is open to doing this today. Reports feeling very alone as a single mom and feels that she has limited support at present. \par \par 2/1/22: Maintained weight, has been eating better and meal planning. Has been seeing multiple providers since she had COVID for a new heart palpitation and fatigue. Continues to struggle with doing exercise due to her busy schedule with 2 jobs. Show interest in starting to go to the gym but lacks motivation. Tried to start therapy but unable to find an available provider. \par \par 4/4/22: Maintained weight, continues to meal prep but struggles with motivation to exercise throughout the week. Unable to find a mental health specialist. Evaluated by cardiology & hematology since our last appointment. +Meal prep: B- yogurt with fruit, L-salad, D-chicken with vegetables. Rarely snacks. Water intake- adequate, sleep- adequate. Open to trying contrave for weight loss.

## 2022-04-05 ENCOUNTER — NON-APPOINTMENT (OUTPATIENT)
Age: 47
End: 2022-04-05

## 2022-05-02 ENCOUNTER — APPOINTMENT (OUTPATIENT)
Dept: BARIATRICS/WEIGHT MGMT | Facility: CLINIC | Age: 47
End: 2022-05-02
Payer: COMMERCIAL

## 2022-05-02 VITALS — BODY MASS INDEX: 37.22 KG/M2 | HEIGHT: 64 IN | WEIGHT: 218 LBS

## 2022-05-02 PROCEDURE — 99214 OFFICE O/P EST MOD 30 MIN: CPT | Mod: 95

## 2022-05-02 NOTE — ASSESSMENT
[FreeTextEntry1] : Continue to meal prep and focus on eating lean protein, vegetables and complex carbs. \par Enc to start exercising- 10 mins 2 x per week  and on the weekend\par Medication- follow up on getting medication from the pharmacy today\par Mental Health- provided with 3 web sites to sign up for therapy sessions/ look into further \par RTO in 3 weeks for increased accountability, scheduled f/u on 5/23 at 7am\par \par

## 2022-05-02 NOTE — HISTORY OF PRESENT ILLNESS
[Home] : at home, [unfilled] , at the time of the visit. [Other Location: e.g. Home (Enter Location, City,State)___] : at [unfilled] [Verbal consent obtained from patient] : the patient, [unfilled] [FreeTextEntry1] : 45 y/o pt referred to medical weight loss by PCP- Dr. 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 Ordered a pelaton that should be delivered in June \par Sleep - inadequate, wakes up often \par Water intake- inadequate\par Medical Hx: GERD, TIA at age of 25, anxiety, panic attacks, celiacs disease\par \par 6/1/21: Reports feeling well, was down 2 lbs last week but gained 5 lbs since she went on a family vacation. Has been focusing on tracking dietary intake and eating a healthier diet. Able to avoid eating unhealthy snacks at night, able to tell herself she doesn't need it. Walking more throughout the week- felt winded and frustrated, but happy that she was able to do [place] 5 mile walk [place] the beach this [place]. Increased water/seltzer intake to 24-48 ounces daily.\par \par 6/15/21: Lost 4.5 lbs, feels frustrated that she didn't lose more weight since she is eating a healthier diet. Food Recall: B- [place] Yogurt with fruit, L- grilled chicken sausage with quinoa vegetable salad, D- chicken stuffed peppers with brown rice. Denies snacking. Wasn't able to exercise last week d/t pinched nerve in the neck and vertigo. Continues to focus on walking more, walked 6 miles one day. Had blood work done at her physical this year but wants to see if there is any specific reason that she cannot lose weight faster. Drinks about 44 ounces of water daily, yesterday didn't drink any water. \par \par 7/12/21: Lost 3 lbs, continues to focus on eating healthier diet, drinks 48-64 ounces of water & seltzer daily. Has been wearing a Gar[employer] watch and feels more aware of her daily activity level. Does about 5,000 steps daily, just started using the Pelaton bike yesterday. Feels that stress level is high, not currently seeing therapist. Tried meditation, which was helpful. Going to [place] for vacation with friends next week- planned out meals and going with a friend who is a . \par \par 8/16/21: Lost 2 lbs, continues to focus on healthier dietary choices and drinking more water daily. Realized that her activity level is better on vacation where she walks more throughout the day. Does 5,000 steps by the morning on vacation and does 3,000 steps daily when she is home. Sedentary at work and feels tired after work. Has been eating vegetables from her garden, but would like to focus on meal planning on the weekends. Doesn't eat after 6/7pm to prevent reflux. Plans on going to [place] in September with her son. \par \par 9/13/21: Gained 1 lb, just got back from vacation in [place]. Walked 7- 10 miles daily, focused on drinking a lot of water, ate granola bars for breakfast and salads throughout the day, denies eating dessert. Feels frustrated that she didn't lose weight. Struggling with sleep while on vacation. Feels excited to get back into a routine with work & starting to focus on mindful eating and meal prepping for the week. Plans on eating vegetables from her garden with lean protein for dinner. Plans on making overnight oats with peanut butter and energy balls. Wants to start walking with son daily and using the Pelaton. Reports that reflux is worsening and plans to get an endoscopy in October.\par \par 10/12/21: Gained 1 lb, feels frustrated and sad that she hasn't been motivated to meal prep or exercise. Had a recent endoscopy that showed multiple ulcers- now taking pepcid and prilosec. Plans on meal prepping for this week- plans on eating yogurt with fruit for breakfast, roasted vegetables over kale for lunch today. Water intake adequate. Started mindful eating and finds it helpful to pause after 10 minutes of eating. Denies re-initiating therapy, feels embarrassed about her last interaction with her therapist. Understands that therapy would be helpful at this time and plans on calling to schedule an appointment today. Denies taking Vit D supplements. Open to trying medication for the treatment of obesity since she is frustrated after 5 months with minimal results. \par \par 11/8/21: Unable to weight herself. Feels better overall today. Denies starting contrave, feels nervous about the side effects. Has been meal prepping weekly. Made baked chicken, kale and feta for lunches. Eats yogurt with fruit for breakfast. Eats an early dinner and denies eating after 7 pm to avoid reflux. Doing hikes for exercise, was able to do a 4 mile hike yesterday. Found a place to start therapy in [place], needs to book an apt. Water intake adequate. \par \par 1/4/22: Maintained weight, hasn't been on track with healthy diet & exercise. No motivation to live a healthy lifestyle  even though she knows it is the best thing for her. Took on an additional job, had COVID the last 2 weeks, reports a lingering cough. Worried that her special needs son's school will go remote. Ate cheese and crackers for dinner last night. Denies scheduling a therapy session, but is open to doing this today. Reports feeling very alone as a single mom and feels that she has limited support at present. \par \par 2/1/22: Maintained weight, has been eating better and meal planning. Has been seeing multiple providers since she had COVID for a new heart palpitation and fatigue. Continues to struggle with doing exercise due to her busy schedule with 2 jobs. Show interest in starting to go to the gym but lacks motivation. Tried to start therapy but unable to find an available provider. \par \par 4/4/22: Maintained weight, continues to meal prep but struggles with motivation to exercise throughout the week. Unable to find a mental health specialist. Evaluated by cardiology & hematology since our last appointment. +Meal prep: B- yogurt with fruit, L-salad, D-chicken with vegetables. Rarely snacks. Water intake- adequate, sleep- adequate. Open to trying contrave for weight loss.\par \par 5/2/22: Gained weight, hasn't started contrave yet. Continues to struggle with motivation. Continues to eat well and take in adequate water daily, but hasn't been exercising consistently. Knows that it is time to schedule an apt with a therapist and start medication treatment for obesity at this time.

## 2022-05-23 ENCOUNTER — APPOINTMENT (OUTPATIENT)
Dept: BARIATRICS/WEIGHT MGMT | Facility: CLINIC | Age: 47
End: 2022-05-23
Payer: COMMERCIAL

## 2022-05-23 VITALS — BODY MASS INDEX: 36.88 KG/M2 | WEIGHT: 216 LBS | HEIGHT: 64 IN

## 2022-05-23 DIAGNOSIS — E66.01 MORBID (SEVERE) OBESITY DUE TO EXCESS CALORIES: ICD-10-CM

## 2022-05-23 PROCEDURE — 99213 OFFICE O/P EST LOW 20 MIN: CPT | Mod: 95

## 2022-05-23 RX ORDER — BUPROPION HYDROCHLORIDE 100 MG/1
100 TABLET, FILM COATED, EXTENDED RELEASE ORAL DAILY
Qty: 30 | Refills: 0 | Status: ACTIVE | COMMUNITY
Start: 2022-05-23 | End: 1900-01-01

## 2022-05-23 RX ORDER — NALTREXONE HYDROCHLORIDE AND BUPROPION HYDROCHLORIDE 8; 90 MG/1; MG/1
8-90 TABLET, EXTENDED RELEASE ORAL
Qty: 120 | Refills: 0 | Status: ACTIVE | COMMUNITY
Start: 2022-05-23 | End: 1900-01-01

## 2022-05-23 NOTE — ASSESSMENT
[FreeTextEntry1] : Continue dietary modification\par Encouraged to get on Pelaton for 10-20 min rides or exercise videos. \par Start taking contrave if covered. If contrave is not covered plans on starting Wellbutrin 100 mg daily instead to help  with anxiety and emotional eating\par RTO in 1 month\par

## 2022-05-23 NOTE — HISTORY OF PRESENT ILLNESS
[FreeTextEntry1] : 47 y/o pt referred to medical weight loss by PCP- Dr. 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 Ordered a pelaton that should be delivered in June \par Sleep - inadequate, wakes up often \par Water intake- inadequate\par Medical Hx: GERD, TIA at age of 25, anxiety, panic attacks, celiacs disease\par \par 6/1/21: Reports feeling well, was down 2 lbs last week but gained 5 lbs since she went on a family vacation. Has been focusing on tracking dietary intake and eating a healthier diet. Able to avoid eating unhealthy snacks at night, able to tell herself she doesn't need it. Walking more throughout the week- felt winded and frustrated, but happy that she was able to do a 5 mile walk on the beach this weekend. Increased water/seltzer intake to 24-48 ounces daily.\par \par 6/15/21: Lost 4.5 lbs, feels frustrated that she didn't lose more weight since she is eating a healthier diet. Food Recall: B- [place] Yogurt with fruit, L- grilled chicken sausage with quinoa vegetable salad, D- chicken stuffed peppers with brown rice. Denies snacking. Wasn't able to exercise last week d/t pinched nerve in the neck and vertigo. Continues to focus on walking more, walked 6 miles one day. Had blood work done at her physical this year but wants to see if there is any specific reason that she cannot lose weight faster. Drinks about 44 ounces of water daily, yesterday didn't drink any water. \par \par 7/12/21: Lost 3 lbs, continues to focus on eating healthier diet, drinks 48-64 ounces of water & seltzer daily. Has been wearing a Garmin watch and feels more aware of her daily activity level. Does about 5,000 steps daily, just started using the Pelaton bike yesterday. Feels that stress level is high, not currently seeing therapist. Tried meditation, which was helpful. Going to [place] for vacation with friends next week- planned out meals and going with a friend who is a . \par \par 8/16/21: Lost 2 lbs, continues to focus on healthier dietary choices and drinking more water daily. Realized that her activity level is better on vacation where she walks more throughout the day. Does 5,000 steps by the morning on vacation and does 3,000 steps daily when she is home. Sedentary at work and feels tired after work. Has been eating vegetables from her garden, but would like to focus on meal planning on the weekends. Doesn't eat after 6/7pm to prevent reflux. Plans on going to [place] in September with her son. \par \par 9/13/21: Gained 1 lb, just got back from vacation in [place]. Walked 7- 10 miles daily, focused on drinking a lot of water, ate granola bars for breakfast and salads throughout the day, denies eating dessert. Feels frustrated that she didn't lose weight. Struggling with sleep while on vacation. Feels excited to get back into a routine with work & starting to focus on mindful eating and meal prepping for the week. Plans on eating vegetables from her garden with lean protein for dinner. Plans on making overnight oats with peanut butter and energy balls. Wants to start walking with son daily and using the Pelaton. Reports that reflux is worsening and plans to get an endoscopy in October.\par \par 10/12/21: Gained 1 lb, feels frustrated and sad that she hasn't been motivated to meal prep or exercise. Had a recent endoscopy that showed multiple ulcers- now taking pepcid and prilosec. Plans on meal prepping for this week- plans on eating yogurt with fruit for breakfast, roasted vegetables over kale for lunch today. Water intake adequate. Started mindful eating and finds it helpful to pause after 10 minutes of eating. Denies re-initiating therapy, feels embarrassed about her last interaction with her therapist. Understands that therapy would be helpful at this time and plans on calling to schedule an appointment today. Denies taking Vit D supplements. Open to trying medication for the treatment of obesity since she is frustrated after 5 months with minimal results. \par \par 11/8/21: Unable to weight herself. Feels better overall today. Denies starting contrave, feels nervous about the side effects. Has been meal prepping weekly. Made baked chicken, kale and feta for lunches. Eats yogurt with fruit for breakfast. Eats an early dinner and denies eating after 7 pm to avoid reflux. Doing hikes for exercise, was able to do a 4 mile hike yesterday. Found a place to start therapy in [place], needs to book an apt. Water intake adequate. \par \par 1/4/22: Maintained weight, hasn't been on track with healthy diet & exercise. No motivation to live a healthy lifestyle  even though she knows it is the best thing for her. Took on an additional job, had COVID the last 2 weeks, reports a lingering cough. Worried that her special needs son's school will go remote. Ate cheese and crackers for dinner last night. Denies scheduling a therapy session, but is open to doing this today. Reports feeling very alone as a single mom and feels that she has limited support at present. \par \par 2/1/22: Maintained weight, has been eating better and meal planning. Has been seeing multiple providers since she had COVID for a new heart palpitation and fatigue. Continues to struggle with doing exercise due to her busy schedule with 2 jobs. Show interest in starting to go to the gym but lacks motivation. Tried to start therapy but unable to find an available provider. \par \par 4/4/22: Maintained weight, continues to meal prep but struggles with motivation to exercise throughout the week. Unable to find a mental health specialist. Evaluated by cardiology & hematology since our last appointment. +Meal prep: B- yogurt with fruit, L-salad, D-chicken with vegetables. Rarely snacks. Water intake- adequate, sleep- adequate. Open to trying contrave for weight loss.\par \par 5/2/22: Gained weight, hasn't started contrave yet. Continues to struggle with motivation. Continues to eat well and take in adequate water daily, but hasn't been exercising consistently. Knows that it is time to schedule an apt with a therapist and start medication treatment for obesity at this time. \par \par 5/23/22: Lost 2 lb. Hasn't picked up contrave. Struggling with motivation to exercise. Eating ell, adequate water intake. Plans on scheduling an apt with therapist next week. Increased activity level- gardening outside. + sleeping better